# Patient Record
Sex: FEMALE | Race: WHITE | NOT HISPANIC OR LATINO | Employment: OTHER | URBAN - METROPOLITAN AREA
[De-identification: names, ages, dates, MRNs, and addresses within clinical notes are randomized per-mention and may not be internally consistent; named-entity substitution may affect disease eponyms.]

---

## 2021-04-06 NOTE — PATIENT DISCUSSION
AMD (DRY DRUSEN), OU___:  RECOMMEND FOLLOW UP ON A YEARLY BASIS AND DAILY UV PROTECTION. RETURN FOR FOLLOW-UP AS SCHEDULED.

## 2022-02-14 ENCOUNTER — PREPPED CHART (OUTPATIENT)
Dept: URBAN - METROPOLITAN AREA CLINIC 50 | Facility: CLINIC | Age: 62
End: 2022-02-14

## 2022-03-03 NOTE — PATIENT DISCUSSION
The IOP is in the target range.  Nerve size is stable.  no need for drop therapy or additional testing at this time.

## 2022-04-05 NOTE — PATIENT DISCUSSION
Patient unable to successfully inserted and removed contact lenses. Will have pt RTC for further I&R training.

## 2022-04-19 NOTE — PATIENT DISCUSSION
Will get trial of contact lenses for patient to try mini-mono. Discussed allowing for adaptation. Patient to call with progress.

## 2022-04-19 NOTE — PATIENT DISCUSSION
Patient to wear trial contact lenses for brief trial period. If patient happy with vision and comfort after trial, ok to finalize contact lens prescription for 1 year supply ([facility] only vs mini-mono).

## 2022-05-18 ENCOUNTER — NEW PATIENT (OUTPATIENT)
Dept: URBAN - METROPOLITAN AREA CLINIC 50 | Facility: CLINIC | Age: 62
End: 2022-05-18

## 2022-05-18 DIAGNOSIS — H25.13: ICD-10-CM

## 2022-05-18 DIAGNOSIS — H40.023: ICD-10-CM

## 2022-05-18 PROCEDURE — 92004 COMPRE OPH EXAM NEW PT 1/>: CPT

## 2022-05-18 PROCEDURE — 92133 CPTRZD OPH DX IMG PST SGM ON: CPT

## 2022-05-18 ASSESSMENT — VISUAL ACUITY
OU_CC: J1+
OU_CC: 20/30-2
OD_CC: 20/400
OD_PH: 20/30
OS_CC: 20/30-2

## 2022-05-18 ASSESSMENT — TONOMETRY
OS_IOP_MMHG: 16
OD_IOP_MMHG: 16

## 2022-05-23 ENCOUNTER — DIAGNOSTICS ONLY (OUTPATIENT)
Dept: URBAN - METROPOLITAN AREA CLINIC 50 | Facility: CLINIC | Age: 62
End: 2022-05-23

## 2022-05-23 DIAGNOSIS — H40.023: ICD-10-CM

## 2022-05-23 PROCEDURE — 92083 EXTENDED VISUAL FIELD XM: CPT

## 2022-06-22 ENCOUNTER — FOLLOW UP (OUTPATIENT)
Dept: URBAN - METROPOLITAN AREA CLINIC 50 | Facility: CLINIC | Age: 62
End: 2022-06-22

## 2022-06-22 DIAGNOSIS — H40.023: ICD-10-CM

## 2022-06-22 PROCEDURE — 92012 INTRM OPH EXAM EST PATIENT: CPT

## 2022-06-22 ASSESSMENT — TONOMETRY
OD_IOP_MMHG: 15
OS_IOP_MMHG: 15

## 2022-06-22 ASSESSMENT — VISUAL ACUITY
OD_CC: 20/400
OS_CC: 20/30-2

## 2022-10-14 NOTE — PATIENT DISCUSSION
Patient came in today with complaints of foreign body sensation. Lid everted, SCL found. Removed with cotton tip applicator and forceps.

## 2023-04-12 ENCOUNTER — COMPREHENSIVE EXAM (OUTPATIENT)
Dept: URBAN - METROPOLITAN AREA CLINIC 50 | Facility: CLINIC | Age: 63
End: 2023-04-12

## 2023-04-12 DIAGNOSIS — H52.13: ICD-10-CM

## 2023-04-12 DIAGNOSIS — H40.023: ICD-10-CM

## 2023-04-12 DIAGNOSIS — Z01.01: ICD-10-CM

## 2023-04-12 DIAGNOSIS — H52.4: ICD-10-CM

## 2023-04-12 DIAGNOSIS — H25.13: ICD-10-CM

## 2023-04-12 DIAGNOSIS — Z97.3: ICD-10-CM

## 2023-04-12 PROCEDURE — 92014 COMPRE OPH EXAM EST PT 1/>: CPT

## 2023-04-12 PROCEDURE — 92015 DETERMINE REFRACTIVE STATE: CPT

## 2023-04-12 PROCEDURE — 92310-3E ESTABLISHED CL PATIENT MULTIFOCAL AND/OR MONOVISION SOFT LENS EVALUATION

## 2023-04-12 ASSESSMENT — VISUAL ACUITY
OD_GLARE: 20/40
OD_PH: 20/40
OS_CC: 20/30
OS_PH: 20/30
OS_GLARE: 20/40
OU_CC: 20/40
OS_GLARE: 20/30
OD_CC: 20/200
OD_GLARE: 20/60
OU_CC: J1+

## 2023-04-12 ASSESSMENT — TONOMETRY
OD_IOP_MMHG: 15
OS_IOP_MMHG: 10
OD_IOP_MMHG: 11
OS_IOP_MMHG: 14

## 2023-04-12 ASSESSMENT — PACHYMETRY
OS_CT_UM: 604
OD_CT_UM: 599

## 2023-04-26 ENCOUNTER — DIAGNOSTICS ONLY (OUTPATIENT)
Dept: URBAN - METROPOLITAN AREA CLINIC 50 | Facility: CLINIC | Age: 63
End: 2023-04-26

## 2023-04-26 DIAGNOSIS — H40.023: ICD-10-CM

## 2023-04-26 PROCEDURE — 92133 CPTRZD OPH DX IMG PST SGM ON: CPT

## 2023-04-26 PROCEDURE — 92083 EXTENDED VISUAL FIELD XM: CPT

## 2023-07-26 ENCOUNTER — DIAGNOSTICS ONLY (OUTPATIENT)
Dept: URBAN - METROPOLITAN AREA CLINIC 50 | Facility: CLINIC | Age: 63
End: 2023-07-26

## 2023-07-26 DIAGNOSIS — H40.023: ICD-10-CM

## 2023-07-26 PROCEDURE — 92083 EXTENDED VISUAL FIELD XM: CPT

## 2023-07-26 PROCEDURE — 92133 CPTRZD OPH DX IMG PST SGM ON: CPT

## 2023-12-28 ENCOUNTER — FOLLOW UP (OUTPATIENT)
Dept: URBAN - METROPOLITAN AREA CLINIC 50 | Facility: LOCATION | Age: 63
End: 2023-12-28

## 2023-12-28 DIAGNOSIS — H40.022: ICD-10-CM

## 2023-12-28 DIAGNOSIS — H40.1111: ICD-10-CM

## 2023-12-28 PROCEDURE — 92083 EXTENDED VISUAL FIELD XM: CPT

## 2023-12-28 PROCEDURE — 92133 CPTRZD OPH DX IMG PST SGM ON: CPT

## 2023-12-28 PROCEDURE — 92012 INTRM OPH EXAM EST PATIENT: CPT

## 2023-12-28 ASSESSMENT — VISUAL ACUITY
OD_CC: 20/80
OU_CC: 20/25-2
OU_SC: J1+@16"
OS_CC: 20/25-1
OD_PH: 20/30

## 2023-12-28 ASSESSMENT — TONOMETRY
OS_IOP_MMHG: 11
OD_IOP_MMHG: 15
OS_IOP_MMHG: 15
OD_IOP_MMHG: 11

## 2024-02-16 ENCOUNTER — CONSULTATION/EVALUATION (OUTPATIENT)
Dept: URBAN - METROPOLITAN AREA CLINIC 50 | Facility: LOCATION | Age: 64
End: 2024-02-16

## 2024-02-16 DIAGNOSIS — H40.1111: ICD-10-CM

## 2024-02-16 DIAGNOSIS — H40.022: ICD-10-CM

## 2024-02-16 PROCEDURE — 99213 OFFICE O/P EST LOW 20 MIN: CPT

## 2024-02-16 ASSESSMENT — TONOMETRY
OD_IOP_MMHG: 18
OS_IOP_MMHG: 17
OS_IOP_MMHG: 13
OD_IOP_MMHG: 14

## 2024-02-16 ASSESSMENT — VISUAL ACUITY
OS_CC: 20/20-3
OD_CC: 20/30

## 2024-10-25 ENCOUNTER — COMPREHENSIVE EXAM (OUTPATIENT)
Dept: URBAN - METROPOLITAN AREA CLINIC 50 | Facility: LOCATION | Age: 64
End: 2024-10-25

## 2024-10-25 DIAGNOSIS — H40.1111: ICD-10-CM

## 2024-10-25 DIAGNOSIS — H25.13: ICD-10-CM

## 2024-10-25 DIAGNOSIS — H40.022: ICD-10-CM

## 2024-10-25 DIAGNOSIS — H43.811: ICD-10-CM

## 2024-10-25 PROCEDURE — 92133 CPTRZD OPH DX IMG PST SGM ON: CPT

## 2024-10-25 PROCEDURE — 92083 EXTENDED VISUAL FIELD XM: CPT

## 2024-10-25 PROCEDURE — 99214 OFFICE O/P EST MOD 30 MIN: CPT

## 2024-11-14 ENCOUNTER — CONTACT LENSES/GLASSES VISIT (OUTPATIENT)
Dept: URBAN - METROPOLITAN AREA CLINIC 50 | Facility: LOCATION | Age: 64
End: 2024-11-14

## 2024-11-14 DIAGNOSIS — Z97.3: ICD-10-CM

## 2024-11-14 PROCEDURE — 92310-1 LEVEL 1 SOFT LENS UPDATE
